# Patient Record
Sex: FEMALE | Race: WHITE | NOT HISPANIC OR LATINO | Employment: OTHER | URBAN - METROPOLITAN AREA
[De-identification: names, ages, dates, MRNs, and addresses within clinical notes are randomized per-mention and may not be internally consistent; named-entity substitution may affect disease eponyms.]

---

## 2017-12-04 RX ORDER — ATORVASTATIN CALCIUM 20 MG/1
TABLET, FILM COATED ORAL
Qty: 180 TABLET | Refills: 0 | OUTPATIENT
Start: 2017-12-04

## 2017-12-04 NOTE — TELEPHONE ENCOUNTER
Refill for statin not sent, patient receives primary care at [facility].     Please do not call patient regarding this rejected script. [facility] has requested that their patients not receive communciation from [facility].    Thanks,  KJ

## 2019-03-06 ENCOUNTER — LAB VISIT (OUTPATIENT)
Dept: LAB | Facility: OTHER | Age: 69
End: 2019-03-06
Attending: ANESTHESIOLOGY
Payer: MEDICARE

## 2019-03-06 ENCOUNTER — OFFICE VISIT (OUTPATIENT)
Dept: PAIN MEDICINE | Facility: CLINIC | Age: 69
End: 2019-03-06
Attending: ANESTHESIOLOGY
Payer: MEDICARE

## 2019-03-06 VITALS
BODY MASS INDEX: 32.28 KG/M2 | HEART RATE: 100 BPM | WEIGHT: 205.69 LBS | DIASTOLIC BLOOD PRESSURE: 82 MMHG | HEIGHT: 67 IN | TEMPERATURE: 98 F | SYSTOLIC BLOOD PRESSURE: 132 MMHG

## 2019-03-06 DIAGNOSIS — M79.10 MYALGIA: ICD-10-CM

## 2019-03-06 DIAGNOSIS — M54.16 LUMBAR RADICULOPATHY: ICD-10-CM

## 2019-03-06 DIAGNOSIS — M25.551 HIP PAIN, RIGHT: ICD-10-CM

## 2019-03-06 DIAGNOSIS — M53.3 SACROILIAC JOINT PAIN: ICD-10-CM

## 2019-03-06 LAB
ANION GAP SERPL CALC-SCNC: 10 MMOL/L
BUN SERPL-MCNC: 20 MG/DL
CALCIUM SERPL-MCNC: 9.3 MG/DL
CHLORIDE SERPL-SCNC: 107 MMOL/L
CO2 SERPL-SCNC: 24 MMOL/L
CREAT SERPL-MCNC: 1.1 MG/DL
ERYTHROCYTE [DISTWIDTH] IN BLOOD BY AUTOMATED COUNT: 17.3 %
EST. GFR  (AFRICAN AMERICAN): 59 ML/MIN/1.73 M^2
EST. GFR  (NON AFRICAN AMERICAN): 51 ML/MIN/1.73 M^2
GLUCOSE SERPL-MCNC: 112 MG/DL
HCT VFR BLD AUTO: 38.7 %
HGB BLD-MCNC: 11.8 G/DL
MCH RBC QN AUTO: 27 PG
MCHC RBC AUTO-ENTMCNC: 30.5 G/DL
MCV RBC AUTO: 89 FL
PLATELET # BLD AUTO: 282 K/UL
PMV BLD AUTO: 11.7 FL
POTASSIUM SERPL-SCNC: 4.7 MMOL/L
RBC # BLD AUTO: 4.37 M/UL
SODIUM SERPL-SCNC: 141 MMOL/L
WBC # BLD AUTO: 9.3 K/UL

## 2019-03-06 PROCEDURE — 3075F SYST BP GE 130 - 139MM HG: CPT | Mod: CPTII,S$GLB,, | Performed by: ANESTHESIOLOGY

## 2019-03-06 PROCEDURE — 99204 PR OFFICE/OUTPT VISIT, NEW, LEVL IV, 45-59 MIN: ICD-10-PCS | Mod: GC,S$GLB,, | Performed by: ANESTHESIOLOGY

## 2019-03-06 PROCEDURE — 80048 BASIC METABOLIC PNL TOTAL CA: CPT

## 2019-03-06 PROCEDURE — 3075F PR MOST RECENT SYSTOLIC BLOOD PRESS GE 130-139MM HG: ICD-10-PCS | Mod: CPTII,S$GLB,, | Performed by: ANESTHESIOLOGY

## 2019-03-06 PROCEDURE — 1101F PR PT FALLS ASSESS DOC 0-1 FALLS W/OUT INJ PAST YR: ICD-10-PCS | Mod: CPTII,S$GLB,, | Performed by: ANESTHESIOLOGY

## 2019-03-06 PROCEDURE — 3079F PR MOST RECENT DIASTOLIC BLOOD PRESSURE 80-89 MM HG: ICD-10-PCS | Mod: CPTII,S$GLB,, | Performed by: ANESTHESIOLOGY

## 2019-03-06 PROCEDURE — 99204 OFFICE O/P NEW MOD 45 MIN: CPT | Mod: GC,S$GLB,, | Performed by: ANESTHESIOLOGY

## 2019-03-06 PROCEDURE — 99999 PR PBB SHADOW E&M-EST. PATIENT-LVL V: CPT | Mod: PBBFAC,,, | Performed by: ANESTHESIOLOGY

## 2019-03-06 PROCEDURE — 36415 COLL VENOUS BLD VENIPUNCTURE: CPT

## 2019-03-06 PROCEDURE — 3079F DIAST BP 80-89 MM HG: CPT | Mod: CPTII,S$GLB,, | Performed by: ANESTHESIOLOGY

## 2019-03-06 PROCEDURE — 99999 PR PBB SHADOW E&M-EST. PATIENT-LVL V: ICD-10-PCS | Mod: PBBFAC,,, | Performed by: ANESTHESIOLOGY

## 2019-03-06 PROCEDURE — 85027 COMPLETE CBC AUTOMATED: CPT

## 2019-03-06 PROCEDURE — 1101F PT FALLS ASSESS-DOCD LE1/YR: CPT | Mod: CPTII,S$GLB,, | Performed by: ANESTHESIOLOGY

## 2019-03-06 RX ORDER — GABAPENTIN 300 MG/1
CAPSULE ORAL
Qty: 180 CAPSULE | Refills: 2 | Status: SHIPPED | OUTPATIENT
Start: 2019-03-06

## 2019-03-06 RX ORDER — GABAPENTIN 300 MG/1
CAPSULE ORAL
Qty: 180 CAPSULE | Refills: 2 | Status: SHIPPED | OUTPATIENT
Start: 2019-03-06 | End: 2019-03-06

## 2019-03-06 NOTE — PROGRESS NOTES
Chronic Pain - New Consult    Referring Physician: Anum Mosher, *    Chief Complaint:   Chief Complaint   Patient presents with    Low-back Pain    Hip Pain        SUBJECTIVE: Disclaimer: This note has been generated using voice-recognition software. There may be typographical errors that have been missed during proof-reading    Initial encounter:    Kassandra Raza presents to the clinic for the evaluation of right back and hip pain. The pain started 30 years ago following a fall and symptoms have been worsening.    Brief history: History of gastric bypass, DM, HTN, and CKD stage 3 sent for evaluation of low back pain and right leg pain by Anum Mosher NP.     Pain Description:     The pain is located in the low back area and radiates to the right groin and down her lateral right leg to her foot with numbness/tingling. Pain is constant.    At BEST  10/10     At WORST  10/10 on the WORST day.      On average pain is rated as 10/10.     Today the pain is rated as 10/10    The pain is described as aching, numbing and throbbing      Symptoms interfere with daily activity and sleeping.     Exacerbating factors: Standing, Walking, Night Time and Getting out of bed/chair.      Mitigating factors ice, medications and rest.     Patient denies urinary incontinence, bowel incontinence and significant weight loss. No night fevers. Reports weakness and loss of sensation in right leg.   Patient denies any suicidal or homicidal ideations.    Pain Medications:  Current:  Naproxen 600mg q12hrs  Gabapentin 600mg BID  Unknown muscle relaxer    Tried in Past:  NSAIDs - Naproxen that helps somewhat  TCA -Never  SNRI -Never  Anti-convulsants - Gabapentin that does not help her pain  Muscle Relaxants -yes, but unsure of the name  Opioids- Tramadol, did not help    Physical Therapy/Home Exercise: yes, just finished for her low back and right hip and it did not help     report:  Reviewed and consistent with  medication use as prescribed.    Pain Procedures: none    Chiropractor -never  Acupuncture - yes, did not help her pain  TENS unit -never  Spinal decompression -never  Joint replacement -never    Imaging:  Right hip and right knee X-ray 1/18/2016  Chronic bony change laterally in the acetabular roof with probable underlying labral degeneration. Chronic irregularity of the greater trochanter. Moderate lower lumbar spondylosis and mild chronic change of SI joints bilaterally. Right knee reveals mild to moderate osteoarthritis.     Past Medical History:   Diagnosis Date    Anxiety     Depression     Hypertension      Past Surgical History:   Procedure Laterality Date    GASTRIC BYPASS      HYSTERECTOMY      TONSILLECTOMY       Social History     Socioeconomic History    Marital status:      Spouse name: Not on file    Number of children: Not on file    Years of education: Not on file    Highest education level: Not on file   Social Needs    Financial resource strain: Not on file    Food insecurity - worry: Not on file    Food insecurity - inability: Not on file    Transportation needs - medical: Not on file    Transportation needs - non-medical: Not on file   Occupational History    Not on file   Tobacco Use    Smoking status: Never Smoker   Substance and Sexual Activity    Alcohol use: No    Drug use: No    Sexual activity: No   Other Topics Concern    Not on file   Social History Narrative    Not on file     History reviewed. No pertinent family history.    Review of patient's allergies indicates:  No Known Allergies    Current Outpatient Medications   Medication Sig    B-complex with vitamin C (Z-BEC OR EQUIV) tablet Take 1 tablet by mouth once daily.    ibuprofen (ADVIL,MOTRIN) 800 MG tablet     lisinopril-hydrochlorothiazide (PRINZIDE,ZESTORETIC) 20-12.5 mg per tablet Take 1 tablet by mouth once daily.    naproxen (NAPROSYN) 500 MG tablet Take 1 tablet (500 mg total) by mouth 2  (two) times daily with meals.    naproxen-diphenhydramine (ALEVE PM) 220-25 mg Tab Take by mouth.    trazodone (DESYREL) 50 MG tablet Take 50 mg by mouth.    alendronate (FOSAMAX) 70 MG tablet     clonazePAM (KLONOPIN) 2 MG Tab Take 2 mg by mouth 2 (two) times daily.    cloNIDine (CATAPRES) 0.2 MG tablet Take 0.2 mg by mouth 2 (two) times daily.    epinastine 0.05 % ophthalmic solution PLACE 1 DROP INTO EACH EYE TWICE A DAY    fluoxetine (PROZAC) 20 MG capsule Take 20 mg by mouth once daily.    gabapentin (NEURONTIN) 300 MG capsule     gabapentin (NEURONTIN) 300 MG capsule 300mg tablet three times a day for 3 days.  Then increase to 2 tablets at night, 1 in AM and 1 in afternoon for 3 days.  Then increase to 2 tablets at night and 2 in morning, 1 tablet in afternoon for 3 days.  Then increase to 2 tablets three times a day (1800mg)    paroxetine (PAXIL) 20 MG tablet      No current facility-administered medications for this visit.        REVIEW OF SYSTEMS:    GENERAL:  No weight loss, malaise or fevers.  HEENT:   No recent changes in vision or hearing.  NECK:  Negative for lumps, no difficulty with swallowing.  RESPIRATORY:  Negative for cough, wheezing or shortness of breath, patient denies any recent URI.  CARDIOVASCULAR:  Negative for chest pain, leg swelling or palpitations.  GI:  Negative for abdominal discomfort, blood in stools or black stools or change in bowel habits.  MUSCULOSKELETAL:  See HPI.  SKIN:  Negative for lesions, rash, and itching.  PSYCH:  No mood disorder or recent psychosocial stressors.  Patients sleep is disturbed secondary to pain.  HEMATOLOGY/LYMPHOLOGY:  Negative for prolonged bleeding, bruising easily or swollen nodes.  Patient is not currently taking any anti-coagulants  ENDO: No history of diabetes or thyroid dysfunction  NEURO:   No history of headaches, syncope, paralysis, seizures or tremors.  All other reviewed and negative other than HPI.    OBJECTIVE:    /82    "Pulse 100   Temp 98 °F (36.7 °C)   Ht 5' 7" (1.702 m)   Wt 93.3 kg (205 lb 11 oz)   BMI 32.22 kg/m²     PHYSICAL EXAMINATION:    GENERAL: Well appearing, in no acute distress, alert and oriented x3.  PSYCH:  Mood and affect appropriate.  SKIN: Skin color, texture, turgor normal, no rashes or lesions.  HEAD/FACE:  Normocephalic, atraumatic. Cranial nerves grossly intact.  CV: RRR with palpation of the radial artery.  PULM: No evidence of respiratory difficulty, symmetric chest rise.  GI:  Soft and non-tender.  BACK: Straight leg raising in the supine position is positive at 60 degrees to radicular pain on the right. Negative SLR on the left. No pain to palpation over the facet joints of the lumbar spine or spinous processes. Normal range of motion with mild pain reproduction with lumbar extension.   EXTREMITIES: Peripheral joint ROM is full and pain free without obvious instability or laxity in all four extremities. No deformities, edema, or skin discoloration. Good capillary refill.  MUSCULOSKELETAL:  Hip and knee provocative maneuvers are negative.  There is no pain with palpation over the sacroiliac joints bilaterally although she did have tenderness inferior to the SI joint on the right.  FABERs test is positive on the right for posterior pain.  FADIRs test is negative.   Bilateral lower extremity strength is 4/5 in bilateral ADF's and 4/5 in left EHL otherwise 5/5 in bilateral lower extremities.  No atrophy or tone abnormalities are noted.  NEURO: Bilateral upper and lower extremity coordination and muscle stretch reflexes are physiologic and symmetric.  Plantar response are downgoing. No clonus.  Diminished sensation in bilateral L5 dermatomal distribution.   GAIT: antalgic    Lab Results   Component Value Date    WBC 7.78 08/30/2016    HGB 12.1 08/30/2016    HCT 39.6 08/30/2016    MCV 96 08/30/2016     08/30/2016     BMP  Lab Results   Component Value Date     08/30/2016    K 4.7 08/30/2016    "  (H) 08/30/2016    CO2 24 08/30/2016    BUN 20 08/30/2016    CREATININE 1.2 08/30/2016    CALCIUM 9.0 08/30/2016    ANIONGAP 9 08/30/2016    ESTGFRAFRICA 54.0 (A) 08/30/2016    EGFRNONAA 46.9 (A) 08/30/2016     Up  ASSESSMENT: 68 y.o. year old female with pain, consistent with     Encounter Diagnoses   Name Primary?    Hip pain, right     Lumbar radiculopathy     Myalgia     Sacroiliac joint pain        PLAN:     -Will order for BMP to assess kidney function as we do not have recent labs since 2016 and since she has been taking Naproxen regularly.     -Based on her history and physical exam, discussed that her primary pain generator seems to be from right lumbar radiculopathy. She is not interested in any injections at this time, but should she wish to proceed she would likely benefit from bilateral L5 TF BRYAN. If this would not provide adequate pain relief can consider right hip intra-articular steroid injection for her pain.     -X-ray right hip, lumbar spine with flexion/extension views, and MRI lumbar spine w/o contrast to further assess for pathology noting her weakness and diminished sensation on exam today in her legs.     -She is interested in using medications to help control her pain at this time. Will increase to Gabapentin 600mg TID with titration schedule discussed with patient today. Can consider changing to Mobic in the future but would like to further assess kidney function with labs first.     -RTC after imaging is complete.     The above plan and management options were discussed at length with patient. Patient is in agreement with the above and verbalized understanding. It will be communicated with the referring physician via electronic record, fax, or mail.    Ameya Boyer  03/06/2019    I reviewed and edited the  resident's note, I conducted my own interview and physical examination and agree with the findings.     Chidi Richardson  03/06/2019

## 2019-03-06 NOTE — LETTER
March 6, 2019      Anum Mosher, NP  [address]  [facility] [place] [address]           [facility] [address]  [address]  [place] [address]  Phone: [phone]  Fax: [phone]          Patient: Kassandra Raza   MR Number: [id number]   YOB: 1950   Date of Visit: 3/6/2019       Dear Anum Mosher:    Thank you for referring Kassandra Raza to me for evaluation. Attached you will find relevant portions of my assessment and plan of care.    If you have questions, please do not hesitate to call me. I look forward to following Kassandra Raza along with you.    Sincerely,    Chidi Richardson MD    Enclosure  CC:  No Recipients    If you would like to receive this communication electronically, please contact [email or web address] or [phone] to request more information on [employer] Link access.    For providers and/or their staff who would like to refer a patient to [facility], please contact us through our one-stop-shop provider referral line, [facility] [place], at [phone].    If you feel you have received this communication in error or would no longer like to receive these types of communications, please e-mail [email or web address]

## 2019-03-12 ENCOUNTER — HOSPITAL ENCOUNTER (OUTPATIENT)
Dept: RADIOLOGY | Facility: OTHER | Age: 69
Discharge: HOME | End: 2019-03-12
Attending: ANESTHESIOLOGY
Payer: MEDICARE

## 2019-03-12 DIAGNOSIS — M54.16 LUMBAR RADICULOPATHY: ICD-10-CM

## 2019-03-12 DIAGNOSIS — M79.10 MYALGIA: ICD-10-CM

## 2019-03-12 DIAGNOSIS — M25.551 HIP PAIN, RIGHT: ICD-10-CM

## 2019-03-12 DIAGNOSIS — M53.3 SACROILIAC JOINT PAIN: ICD-10-CM

## 2019-03-12 PROCEDURE — 72148 MRI LUMBAR SPINE WITHOUT CONTRAST: ICD-10-PCS | Mod: 26,,, | Performed by: RADIOLOGY

## 2019-03-12 PROCEDURE — 72148 MRI LUMBAR SPINE W/O DYE: CPT | Mod: TC

## 2019-03-12 PROCEDURE — 72148 MRI LUMBAR SPINE W/O DYE: CPT | Mod: 26,,, | Performed by: RADIOLOGY

## 2019-03-18 ENCOUNTER — TELEPHONE (OUTPATIENT)
Dept: PAIN MEDICINE | Facility: CLINIC | Age: 69
End: 2019-03-18

## 2019-03-18 ENCOUNTER — HOSPITAL ENCOUNTER (OUTPATIENT)
Dept: RADIOLOGY | Facility: OTHER | Age: 69
Discharge: HOME | End: 2019-03-18
Attending: ANESTHESIOLOGY
Payer: MEDICARE

## 2019-03-18 DIAGNOSIS — M25.551 HIP PAIN, RIGHT: ICD-10-CM

## 2019-03-18 DIAGNOSIS — M53.3 SACROILIAC JOINT PAIN: ICD-10-CM

## 2019-03-18 DIAGNOSIS — M79.10 MYALGIA: ICD-10-CM

## 2019-03-18 DIAGNOSIS — M54.16 LUMBAR RADICULOPATHY: ICD-10-CM

## 2019-03-18 PROCEDURE — 73521 XR HIPS BILATERAL 2 VIEW INCL AP PELVIS: ICD-10-PCS | Mod: 26,,, | Performed by: RADIOLOGY

## 2019-03-18 PROCEDURE — 72110 X-RAY EXAM L-2 SPINE 4/>VWS: CPT | Mod: 26,,, | Performed by: RADIOLOGY

## 2019-03-18 PROCEDURE — 73521 X-RAY EXAM HIPS BI 2 VIEWS: CPT | Mod: 26,,, | Performed by: RADIOLOGY

## 2019-03-18 PROCEDURE — 72110 X-RAY EXAM L-2 SPINE 4/>VWS: CPT | Mod: TC,FY

## 2019-03-18 PROCEDURE — 72110 XR LUMBAR SPINE 5 VIEW WITH FLEX AND EXT: ICD-10-PCS | Mod: 26,,, | Performed by: RADIOLOGY

## 2019-03-18 PROCEDURE — 73521 X-RAY EXAM HIPS BI 2 VIEWS: CPT | Mod: TC,FY

## 2019-03-18 NOTE — TELEPHONE ENCOUNTER
Patient came into the office to report that her gabapentin is not providing her any relief.     She is currently taking about 4-6 advil daily.     She is currently taking 300mg two pills three times a day.

## 2019-03-26 NOTE — TELEPHONE ENCOUNTER
Contacted patient as staff have received response from provider. There was no answer, left a voicemail requesting a return call to discuss.

## 2019-04-03 ENCOUNTER — OFFICE VISIT (OUTPATIENT)
Dept: PAIN MEDICINE | Facility: CLINIC | Age: 69
End: 2019-04-03
Attending: ANESTHESIOLOGY
Payer: MEDICARE

## 2019-04-03 VITALS
DIASTOLIC BLOOD PRESSURE: 91 MMHG | TEMPERATURE: 98 F | HEIGHT: 67 IN | SYSTOLIC BLOOD PRESSURE: 148 MMHG | WEIGHT: 210.13 LBS | HEART RATE: 101 BPM | BODY MASS INDEX: 32.98 KG/M2

## 2019-04-03 DIAGNOSIS — M54.16 LUMBAR RADICULOPATHY: Primary | ICD-10-CM

## 2019-04-03 DIAGNOSIS — M79.10 MYALGIA: ICD-10-CM

## 2019-04-03 DIAGNOSIS — M25.551 HIP PAIN, RIGHT: ICD-10-CM

## 2019-04-03 DIAGNOSIS — M53.3 SACROILIAC JOINT PAIN: ICD-10-CM

## 2019-04-03 PROCEDURE — 3080F DIAST BP >= 90 MM HG: CPT | Mod: CPTII,S$GLB,, | Performed by: ANESTHESIOLOGY

## 2019-04-03 PROCEDURE — 3077F PR MOST RECENT SYSTOLIC BLOOD PRESSURE >= 140 MM HG: ICD-10-PCS | Mod: CPTII,S$GLB,, | Performed by: ANESTHESIOLOGY

## 2019-04-03 PROCEDURE — 1101F PR PT FALLS ASSESS DOC 0-1 FALLS W/OUT INJ PAST YR: ICD-10-PCS | Mod: CPTII,S$GLB,, | Performed by: ANESTHESIOLOGY

## 2019-04-03 PROCEDURE — 3077F SYST BP >= 140 MM HG: CPT | Mod: CPTII,S$GLB,, | Performed by: ANESTHESIOLOGY

## 2019-04-03 PROCEDURE — 1101F PT FALLS ASSESS-DOCD LE1/YR: CPT | Mod: CPTII,S$GLB,, | Performed by: ANESTHESIOLOGY

## 2019-04-03 PROCEDURE — 99999 PR PBB SHADOW E&M-EST. PATIENT-LVL IV: CPT | Mod: PBBFAC,,, | Performed by: ANESTHESIOLOGY

## 2019-04-03 PROCEDURE — 99214 OFFICE O/P EST MOD 30 MIN: CPT | Mod: S$GLB,,, | Performed by: ANESTHESIOLOGY

## 2019-04-03 PROCEDURE — 99214 PR OFFICE/OUTPT VISIT, EST, LEVL IV, 30-39 MIN: ICD-10-PCS | Mod: S$GLB,,, | Performed by: ANESTHESIOLOGY

## 2019-04-03 PROCEDURE — 99999 PR PBB SHADOW E&M-EST. PATIENT-LVL IV: ICD-10-PCS | Mod: PBBFAC,,, | Performed by: ANESTHESIOLOGY

## 2019-04-03 PROCEDURE — 3080F PR MOST RECENT DIASTOLIC BLOOD PRESSURE >= 90 MM HG: ICD-10-PCS | Mod: CPTII,S$GLB,, | Performed by: ANESTHESIOLOGY

## 2019-04-03 RX ORDER — MELOXICAM 7.5 MG/1
7.5 TABLET ORAL DAILY
Qty: 60 TABLET | Refills: 0 | Status: SHIPPED | OUTPATIENT
Start: 2019-04-03 | End: 2019-05-03

## 2019-04-03 NOTE — PROGRESS NOTES
Chronic Pain - follow up    Referring Physician: No ref. provider found    Chief Complaint:   No chief complaint on file.       SUBJECTIVE: Disclaimer: This note has been generated using voice-recognition software. There may be typographical errors that have been missed during proof-reading  Interval history 04/03/2019:  Since previous encounter the patient had MRI of the lumbar spine which shows multilevel bilateral neural foraminal stenosis worst at L3-4 which may account for part radicular symptoms of weakness.  Additionally she has x-rays of the lumbar spine which did not reveal any evidence of instability with flexion extension and an x-ray of the hip which showed age-appropriate degenerative arthritis.  The BMP did not show any elevation in her creatinine and gabapentin 600 mg t.i.d. has been helping with her neuropathic symptoms in her lower extremity at night but not helping with her back pain in a way that she considers meaningful.  She is concerned about the weakness in her lower extremity and continues to exercise on a daily basis. No other health changes since previous encounter.    Initial encounter:    Kassandra Raza presents to the clinic for the evaluation of right back and hip pain. The pain started 30 years ago following a fall and symptoms have been worsening.    Brief history: History of gastric bypass, DM, HTN, and CKD stage 3 sent for evaluation of low back pain and right leg pain by Anum Mosher NP.     Pain Description:     The pain is located in the low back area and radiates to the right groin and down her lateral right leg to her foot with numbness/tingling. Pain is constant.    At BEST  10/10     At WORST  10/10 on the WORST day.      On average pain is rated as 10/10.     Today the pain is rated as 10/10    The pain is described as aching, numbing and throbbing      Symptoms interfere with daily activity and sleeping.     Exacerbating factors: Standing, Walking, Night Time and  Getting out of bed/chair.      Mitigating factors ice, medications and rest.     Patient denies urinary incontinence, bowel incontinence and significant weight loss. No night fevers. Reports weakness and loss of sensation in right leg.   Patient denies any suicidal or homicidal ideations.    Pain Medications:  Current:  Naproxen 600mg q12hrs  Gabapentin 600mg BID  Unknown muscle relaxer    Tried in Past:  NSAIDs - Naproxen that helps somewhat  TCA -Never  SNRI -Never  Anti-convulsants - Gabapentin that does not help her pain  Muscle Relaxants -yes, but unsure of the name  Opioids- Tramadol, did not help    Physical Therapy/Home Exercise: yes, just finished for her low back and right hip and it did not help     report:  Reviewed and consistent with medication use as prescribed.    Pain Procedures: none    Chiropractor -never  Acupuncture - yes, did not help her pain  TENS unit -never  Spinal decompression -never  Joint replacement -never    Imaging:  MRI lumbar spine   3/12/2019  EXAMINATION:  MRI LUMBAR SPINE WITHOUT CONTRAST    CLINICAL HISTORY:  right lower extremity radiculopathy with L4/5 weakness bilaterally and decreased sensation in the L5 distribution; Pain in right hip    TECHNIQUE:  Multiplanar, multisequence MR images were acquired from the thoracolumbar junction to the sacrum without the administration of contrast.    COMPARISON:  None.    FINDINGS:  The vertebral bodies demonstrate no evidence of fracture, osseous destructive process or aggressive bone marrow replacement process.    Subtle anterolisthesis at L3-4.  Otherwise normal sagittal alignment is preserved.    Degenerative changes individual disc levels further detailed below:    L1-2: Minimal disc bulging and facet arthropathy.  No spinal stenosis or neural foraminal narrowing.    L2-3: Mild facet arthropathy.  No significant degenerative disc disease.  No spinal stenosis or neural foraminal narrowing.    L3-4: Moderate facet arthropathy with  thickening ligamentum flavum.  Moderate degenerative disc disease with loss of disc height, degenerative endplate changes, endplate marginal osteophyte formation and mild disc bulging.  Mild anterolisthesis with unroofing of the disc.  There is mild bilateral lateral recess stenosis and moderate bilateral neural foraminal narrowing.  No spinal stenosis.    L4-5: Left foraminal annular fissure.  Moderate facet arthropathy.  No significant spinal stenosis.  Mild bilateral neural foraminal encroachment.    L5-S1: Moderate facet arthropathy.  No significant degenerative disc disease.  No spinal stenosis or neural foraminal narrowing.    Spinal canal remains capacious.  The terminal spinal cord, conus, and cauda equina demonstrate normal caliber, morphology, and signal.  No spinal canal or paraspinous mass is identified.    Moderate colonic diverticulosis.    Rounded T2 hyperintense foci in the kidneys, likely simple cysts.    Edema like signal in the dependent subcutaneous soft tissues.      Impression       Moderate degenerative change at the L3-4 level as above with resultant lateral recess and neural foraminal narrowing.    Modest degenerative change elsewhere in the lumbar spine without significant spinal stenosis or high-grade neural foraminal narrowing.    Moderate colonic diverticulosis.  Probable renal cysts.     Xray of the hips 3/18/2019  EXAMINATION:  XR HIPS BILATERAL 2 VIEW INCL AP PELVIS    CLINICAL HISTORY:  Pain in right hip    TECHNIQUE:  AP view of the pelvis and frogleg lateral views of both hips were performed.    COMPARISON:  None.    FINDINGS:  The osseous structures, soft tissues and joint spaces are preserved.  Incidental note is made of a linear radiopaque focus that appears to be with in the rectal stool.      Impression       This exam is within normal limits.     Xray lumbar spine flex/ext  XR LUMBAR SPINE 5 VIEW WITH FLEX AND EXT    CLINICAL HISTORY:  r/o instability evaluate facet  arthropathy;  Pain in right hip    TECHNIQUE:  AP and lateral views as well as lateral flexion and extension images are performed through the lumbar spine.    COMPARISON:  None    FINDINGS:  AP, lateral, flexion and extension, obliqueand coned down lateral radiographs of the lumbar spine reveal 5 non-rib bearing lumbar vertebral bodies with normal vertebral body heights and pedicles.  There is advanced loss of disc height with Modic degenerative endplate changes at L3-L4.  The remaining interval vertebral disc heights are preserved.  There is minimal anterolisthesis at L3-L4 and L4-L5. No instability on flexion and extension views.  Advanced facet arthropathy is identified from L3 through S1.  Facet arthropathy is also noted to mild degree at L2-L3.  Calcified aortoiliac atherosclerosis is identified.  Incidental note is made of cholecystectomy clips.  The surrounding soft tissues are normal.      Impression       1. Multilevel degenerative disc and joint disease.  The degenerative disc disease is most advanced at L3-L4.  2. Minimal anterolisthesis of L3 on L4 and L4 on L5 without instability.         Right hip and right knee X-ray 1/18/2016  Chronic bony change laterally in the acetabular roof with probable underlying labral degeneration. Chronic irregularity of the greater trochanter. Moderate lower lumbar spondylosis and mild chronic change of SI joints bilaterally. Right knee reveals mild to moderate osteoarthritis.     Past Medical History:   Diagnosis Date    Anxiety     Depression     Hypertension      Past Surgical History:   Procedure Laterality Date    GASTRIC BYPASS      HYSTERECTOMY      TONSILLECTOMY       Social History     Socioeconomic History    Marital status:      Spouse name: Not on file    Number of children: Not on file    Years of education: Not on file    Highest education level: Not on file   Occupational History    Not on file   Social Needs    Financial resource strain:  Not on file    Food insecurity:     Worry: Not on file     Inability: Not on file    Transportation needs:     Medical: Not on file     Non-medical: Not on file   Tobacco Use    Smoking status: Never Smoker   Substance and Sexual Activity    Alcohol use: No    Drug use: No    Sexual activity: Never   Lifestyle    Physical activity:     Days per week: Not on file     Minutes per session: Not on file    Stress: Not on file   Relationships    Social connections:     Talks on phone: Not on file     Gets together: Not on file     Attends Baptism service: Not on file     Active member of club or organization: Not on file     Attends meetings of clubs or organizations: Not on file     Relationship status: Not on file   Other Topics Concern    Not on file   Social History Narrative    Not on file     No family history on file.    Review of patient's allergies indicates:  No Known Allergies    Current Outpatient Medications   Medication Sig    B-complex with vitamin C (Z-BEC OR EQUIV) tablet Take 1 tablet by mouth once daily.    cloNIDine (CATAPRES) 0.2 MG tablet Take 0.2 mg by mouth 2 (two) times daily.    epinastine 0.05 % ophthalmic solution PLACE 1 DROP INTO EACH EYE TWICE A DAY    gabapentin (NEURONTIN) 300 MG capsule 300mg tablet three times a day for 7 days.  Then increase to 2 tablets at night, 1 in AM and 1 in afternoon for 7 days.  Then increase to 2 tablets at night and 2 in morning, 1 tablet in afternoon for 7 days.  Then increase to 2 tablets three times a day (1800mg)    lisinopril-hydrochlorothiazide (PRINZIDE,ZESTORETIC) 20-12.5 mg per tablet Take 1 tablet by mouth once daily.    naproxen (NAPROSYN) 500 MG tablet Take 1 tablet (500 mg total) by mouth 2 (two) times daily with meals.    naproxen-diphenhydramine (ALEVE PM) 220-25 mg Tab Take by mouth.    trazodone (DESYREL) 50 MG tablet Take 50 mg by mouth.    alendronate (FOSAMAX) 70 MG tablet     clonazePAM (KLONOPIN) 2 MG Tab Take 2 mg  "by mouth 2 (two) times daily.    fluoxetine (PROZAC) 20 MG capsule Take 20 mg by mouth once daily.    ibuprofen (ADVIL,MOTRIN) 800 MG tablet     paroxetine (PAXIL) 20 MG tablet      No current facility-administered medications for this visit.        REVIEW OF SYSTEMS:    GENERAL:  No weight loss, malaise or fevers.  HEENT:   No recent changes in vision or hearing.  NECK:  Negative for lumps, no difficulty with swallowing.  RESPIRATORY:  Negative for cough, wheezing or shortness of breath, patient denies any recent URI.  CARDIOVASCULAR:  Negative for chest pain, leg swelling or palpitations.  GI:  Negative for abdominal discomfort, blood in stools or black stools or change in bowel habits.  MUSCULOSKELETAL:  See HPI.  SKIN:  Negative for lesions, rash, and itching.  PSYCH:  No mood disorder or recent psychosocial stressors.  Patients sleep is disturbed secondary to pain.  HEMATOLOGY/LYMPHOLOGY:  Negative for prolonged bleeding, bruising easily or swollen nodes.  Patient is not currently taking any anti-coagulants  ENDO: No history of diabetes or thyroid dysfunction  NEURO:   No history of headaches, syncope, paralysis, seizures or tremors.  All other reviewed and negative other than HPI.    OBJECTIVE:    BP (!) 148/91   Pulse 101   Temp 98.3 °F (36.8 °C)   Ht 5' 7" (1.702 m)   Wt 95.3 kg (210 lb 1.6 oz)   BMI 32.91 kg/m²     PHYSICAL EXAMINATION:    GENERAL: Well appearing, in no acute distress, alert and oriented x3.  PSYCH:  Mood and affect appropriate.  SKIN: Skin color, texture, turgor normal, no rashes or lesions.  HEAD/FACE:  Normocephalic, atraumatic. Cranial nerves grossly intact.  CV: RRR with palpation of the radial artery.  PULM: No evidence of respiratory difficulty, symmetric chest rise.  GI:  Soft and non-tender.  BACK: Straight leg raising in the supine position is positive at 60 degrees to radicular pain on the right. Negative SLR on the left. No pain to palpation over the facet joints of the " lumbar spine or spinous processes. Normal range of motion with mild pain reproduction with lumbar extension.   EXTREMITIES: Peripheral joint ROM is full and pain free without obvious instability or laxity in all four extremities. No deformities, edema, or skin discoloration. Good capillary refill.  MUSCULOSKELETAL:  Hip and knee provocative maneuvers are negative.  There is no pain with palpation over the sacroiliac joints bilaterally although she did have tenderness inferior to the SI joint on the right.  FABERs test is positive on the right for posterior pain.  FADIRs test is negative.   Bilateral lower extremity strength is 4/5 in bilateral ADF's and 4/5 in left EHL otherwise 5/5 in bilateral lower extremities.  No atrophy or tone abnormalities are noted.  NEURO: Bilateral upper and lower extremity coordination and muscle stretch reflexes are physiologic and symmetric.  Plantar response are downgoing. No clonus.  Diminished sensation in bilateral L5 dermatomal distribution.   GAIT: antalgic    Lab Results   Component Value Date    WBC 9.30 03/06/2019    HGB 11.8 (L) 03/06/2019    HCT 38.7 03/06/2019    MCV 89 03/06/2019     03/06/2019     BMP  Lab Results   Component Value Date     03/06/2019    K 4.7 03/06/2019     03/06/2019    CO2 24 03/06/2019    BUN 20 03/06/2019    CREATININE 1.1 03/06/2019    CALCIUM 9.3 03/06/2019    ANIONGAP 10 03/06/2019    ESTGFRAFRICA 59 (A) 03/06/2019    EGFRNONAA 51 (A) 03/06/2019     Up  ASSESSMENT: 69 y.o. year old female with pain, consistent with     Encounter Diagnoses   Name Primary?    Lumbar radiculopathy Yes    Myalgia     Sacroiliac joint pain     Hip pain, right        PLAN:     -meloxicam 7.5 mg per day for 1 week if insufficient increase to 15 mg per day    -Based on her history and physical exam, discussed that her primary pain generator seems to be from right lumbar radiculopathy. She is not interested in any injections at this time, but should  she wish to proceed she would likely benefit from bilateral L4 TF BRYAN. If this would not provide adequate pain relief can consider right hip intra-articular steroid injection for her pain.     -continue gabapentin 600 mg t.i.d.    -continue home exercises    -follow-up in 2 weeks with NP and depending on response to meloxicam if there is insufficient relief will consider the epidural injections, if there is good relief continue physical therapy for strengthening of the lower extremity and follow-up in 3 months.    The above plan and management options were discussed at length with patient. Patient is in agreement with the above and verbalized understanding. It will be communicated with the referring physician via electronic record, fax, or mail.    Chidi Richardson  04/03/2019

## 2019-04-15 ENCOUNTER — OFFICE VISIT (OUTPATIENT)
Dept: PAIN MEDICINE | Facility: CLINIC | Age: 69
End: 2019-04-15
Payer: MEDICARE

## 2019-04-15 VITALS
BODY MASS INDEX: 32.28 KG/M2 | TEMPERATURE: 98 F | DIASTOLIC BLOOD PRESSURE: 81 MMHG | WEIGHT: 205.69 LBS | HEART RATE: 78 BPM | SYSTOLIC BLOOD PRESSURE: 152 MMHG | HEIGHT: 67 IN

## 2019-04-15 DIAGNOSIS — M47.26 OSTEOARTHRITIS OF SPINE WITH RADICULOPATHY, LUMBAR REGION: ICD-10-CM

## 2019-04-15 DIAGNOSIS — M53.3 SACROILIAC JOINT PAIN: ICD-10-CM

## 2019-04-15 DIAGNOSIS — M54.16 LUMBAR RADICULOPATHY: Primary | ICD-10-CM

## 2019-04-15 DIAGNOSIS — M51.36 DDD (DEGENERATIVE DISC DISEASE), LUMBAR: ICD-10-CM

## 2019-04-15 PROCEDURE — 99999 PR PBB SHADOW E&M-EST. PATIENT-LVL III: ICD-10-PCS | Mod: PBBFAC,,, | Performed by: NURSE PRACTITIONER

## 2019-04-15 PROCEDURE — 99213 PR OFFICE/OUTPT VISIT, EST, LEVL III, 20-29 MIN: ICD-10-PCS | Mod: S$GLB,,, | Performed by: NURSE PRACTITIONER

## 2019-04-15 PROCEDURE — 3077F PR MOST RECENT SYSTOLIC BLOOD PRESSURE >= 140 MM HG: ICD-10-PCS | Mod: CPTII,S$GLB,, | Performed by: NURSE PRACTITIONER

## 2019-04-15 PROCEDURE — 1101F PT FALLS ASSESS-DOCD LE1/YR: CPT | Mod: CPTII,S$GLB,, | Performed by: NURSE PRACTITIONER

## 2019-04-15 PROCEDURE — 99213 OFFICE O/P EST LOW 20 MIN: CPT | Mod: S$GLB,,, | Performed by: NURSE PRACTITIONER

## 2019-04-15 PROCEDURE — 3079F PR MOST RECENT DIASTOLIC BLOOD PRESSURE 80-89 MM HG: ICD-10-PCS | Mod: CPTII,S$GLB,, | Performed by: NURSE PRACTITIONER

## 2019-04-15 PROCEDURE — 99999 PR PBB SHADOW E&M-EST. PATIENT-LVL III: CPT | Mod: PBBFAC,,, | Performed by: NURSE PRACTITIONER

## 2019-04-15 PROCEDURE — 3079F DIAST BP 80-89 MM HG: CPT | Mod: CPTII,S$GLB,, | Performed by: NURSE PRACTITIONER

## 2019-04-15 PROCEDURE — 3077F SYST BP >= 140 MM HG: CPT | Mod: CPTII,S$GLB,, | Performed by: NURSE PRACTITIONER

## 2019-04-15 PROCEDURE — 1101F PR PT FALLS ASSESS DOC 0-1 FALLS W/OUT INJ PAST YR: ICD-10-PCS | Mod: CPTII,S$GLB,, | Performed by: NURSE PRACTITIONER

## 2019-04-15 NOTE — PROGRESS NOTES
Chronic Pain - follow up    Referring Physician: No ref. provider found    Chief Complaint:   Chief Complaint   Patient presents with    Low-back Pain    Leg Pain        SUBJECTIVE: Disclaimer: This note has been generated using voice-recognition software. There may be typographical errors that have been missed during proof-reading    Interval History 4/15/2019:  The patient returns to clinic today for follow up. She continues to report low back pain that radiates down the anterolateral aspect of her right leg to her foot. She also reports pain that radiates into her right groin. She denies any left leg pain. Her pain is worse with prolonged walking. She is currently taking Gabapentin 600 mg TID with some benefit. She reports limited relief with Mobic. She denies any other health changes. She denies any bowel or bladder incontinence. Her pain today is 8/10.     Interval history 04/03/2019:  Since previous encounter the patient had MRI of the lumbar spine which shows multilevel bilateral neural foraminal stenosis worst at L3-4 which may account for part radicular symptoms of weakness.  Additionally she has x-rays of the lumbar spine which did not reveal any evidence of instability with flexion extension and an x-ray of the hip which showed age-appropriate degenerative arthritis.  The BMP did not show any elevation in her creatinine and gabapentin 600 mg t.i.d. has been helping with her neuropathic symptoms in her lower extremity at night but not helping with her back pain in a way that she considers meaningful.  She is concerned about the weakness in her lower extremity and continues to exercise on a daily basis. No other health changes since previous encounter.    Initial encounter:    Kassandra Raza presents to the clinic for the evaluation of right back and hip pain. The pain started 30 years ago following a fall and symptoms have been worsening.    Brief history: History of gastric bypass, DM, HTN, and CKD  stage 3 sent for evaluation of low back pain and right leg pain by Anum Mosher NP.     Pain Description:     The pain is located in the low back area and radiates to the right groin and down her lateral right leg to her foot with numbness/tingling. Pain is constant.    At BEST  10/10     At WORST  10/10 on the WORST day.      On average pain is rated as 10/10.     Today the pain is rated as 10/10    The pain is described as aching, numbing and throbbing      Symptoms interfere with daily activity and sleeping.     Exacerbating factors: Standing, Walking, Night Time and Getting out of bed/chair.      Mitigating factors ice, medications and rest.     Patient denies urinary incontinence, bowel incontinence and significant weight loss. No night fevers. Reports weakness and loss of sensation in right leg.   Patient denies any suicidal or homicidal ideations.    Pain Medications:  Current:  Mobic  Gabapentin 600mg TID  Unknown muscle relaxer    Tried in Past:  NSAIDs - Naproxen that helps somewhat  TCA -Never  SNRI -Never  Anti-convulsants - Gabapentin that does not help her pain  Muscle Relaxants -yes, but unsure of the name  Opioids- Tramadol, did not help    Physical Therapy/Home Exercise: yes, just finished for her low back and right hip and it did not help     report:  Reviewed and consistent with medication use as prescribed.    Pain Procedures: none    Chiropractor -never  Acupuncture - yes, did not help her pain  TENS unit -never  Spinal decompression -never  Joint replacement -never    Imaging:  MRI lumbar spine   3/12/2019  EXAMINATION:  MRI LUMBAR SPINE WITHOUT CONTRAST    CLINICAL HISTORY:  right lower extremity radiculopathy with L4/5 weakness bilaterally and decreased sensation in the L5 distribution; Pain in right hip    TECHNIQUE:  Multiplanar, multisequence MR images were acquired from the thoracolumbar junction to the sacrum without the administration of  contrast.    COMPARISON:  None.    FINDINGS:  The vertebral bodies demonstrate no evidence of fracture, osseous destructive process or aggressive bone marrow replacement process.    Subtle anterolisthesis at L3-4.  Otherwise normal sagittal alignment is preserved.    Degenerative changes individual disc levels further detailed below:    L1-2: Minimal disc bulging and facet arthropathy.  No spinal stenosis or neural foraminal narrowing.    L2-3: Mild facet arthropathy.  No significant degenerative disc disease.  No spinal stenosis or neural foraminal narrowing.    L3-4: Moderate facet arthropathy with thickening ligamentum flavum.  Moderate degenerative disc disease with loss of disc height, degenerative endplate changes, endplate marginal osteophyte formation and mild disc bulging.  Mild anterolisthesis with unroofing of the disc.  There is mild bilateral lateral recess stenosis and moderate bilateral neural foraminal narrowing.  No spinal stenosis.    L4-5: Left foraminal annular fissure.  Moderate facet arthropathy.  No significant spinal stenosis.  Mild bilateral neural foraminal encroachment.    L5-S1: Moderate facet arthropathy.  No significant degenerative disc disease.  No spinal stenosis or neural foraminal narrowing.    Spinal canal remains capacious.  The terminal spinal cord, conus, and cauda equina demonstrate normal caliber, morphology, and signal.  No spinal canal or paraspinous mass is identified.    Moderate colonic diverticulosis.    Rounded T2 hyperintense foci in the kidneys, likely simple cysts.    Edema like signal in the dependent subcutaneous soft tissues.      Impression       Moderate degenerative change at the L3-4 level as above with resultant lateral recess and neural foraminal narrowing.    Modest degenerative change elsewhere in the lumbar spine without significant spinal stenosis or high-grade neural foraminal narrowing.    Moderate colonic diverticulosis.  Probable renal cysts.      Xray of the hips 3/18/2019  EXAMINATION:  XR HIPS BILATERAL 2 VIEW INCL AP PELVIS    CLINICAL HISTORY:  Pain in right hip    TECHNIQUE:  AP view of the pelvis and frogleg lateral views of both hips were performed.    COMPARISON:  None.    FINDINGS:  The osseous structures, soft tissues and joint spaces are preserved.  Incidental note is made of a linear radiopaque focus that appears to be with in the rectal stool.      Impression       This exam is within normal limits.     Xray lumbar spine flex/ext  XR LUMBAR SPINE 5 VIEW WITH FLEX AND EXT    CLINICAL HISTORY:  r/o instability evaluate facet arthropathy;  Pain in right hip    TECHNIQUE:  AP and lateral views as well as lateral flexion and extension images are performed through the lumbar spine.    COMPARISON:  None    FINDINGS:  AP, lateral, flexion and extension, obliqueand coned down lateral radiographs of the lumbar spine reveal 5 non-rib bearing lumbar vertebral bodies with normal vertebral body heights and pedicles.  There is advanced loss of disc height with Modic degenerative endplate changes at L3-L4.  The remaining interval vertebral disc heights are preserved.  There is minimal anterolisthesis at L3-L4 and L4-L5. No instability on flexion and extension views.  Advanced facet arthropathy is identified from L3 through S1.  Facet arthropathy is also noted to mild degree at L2-L3.  Calcified aortoiliac atherosclerosis is identified.  Incidental note is made of cholecystectomy clips.  The surrounding soft tissues are normal.      Impression       1. Multilevel degenerative disc and joint disease.  The degenerative disc disease is most advanced at L3-L4.  2. Minimal anterolisthesis of L3 on L4 and L4 on L5 without instability.         Right hip and right knee X-ray 1/18/2016  Chronic bony change laterally in the acetabular roof with probable underlying labral degeneration. Chronic irregularity of the greater trochanter. Moderate lower lumbar spondylosis  and mild chronic change of SI joints bilaterally. Right knee reveals mild to moderate osteoarthritis.     Past Medical History:   Diagnosis Date    Anxiety     Depression     Hypertension      Past Surgical History:   Procedure Laterality Date    GASTRIC BYPASS      HYSTERECTOMY      TONSILLECTOMY       Social History     Socioeconomic History    Marital status:      Spouse name: Not on file    Number of children: Not on file    Years of education: Not on file    Highest education level: Not on file   Occupational History    Not on file   Social Needs    Financial resource strain: Not on file    Food insecurity:     Worry: Not on file     Inability: Not on file    Transportation needs:     Medical: Not on file     Non-medical: Not on file   Tobacco Use    Smoking status: Never Smoker   Substance and Sexual Activity    Alcohol use: No    Drug use: No    Sexual activity: Never   Lifestyle    Physical activity:     Days per week: Not on file     Minutes per session: Not on file    Stress: Not on file   Relationships    Social connections:     Talks on phone: Not on file     Gets together: Not on file     Attends Yarsanism service: Not on file     Active member of club or organization: Not on file     Attends meetings of clubs or organizations: Not on file     Relationship status: Not on file   Other Topics Concern    Not on file   Social History Narrative    Not on file     No family history on file.    Review of patient's allergies indicates:  No Known Allergies    Current Outpatient Medications   Medication Sig    alendronate (FOSAMAX) 70 MG tablet     B-complex with vitamin C (Z-BEC OR EQUIV) tablet Take 1 tablet by mouth once daily.    clonazePAM (KLONOPIN) 2 MG Tab Take 2 mg by mouth 2 (two) times daily.    cloNIDine (CATAPRES) 0.2 MG tablet Take 0.2 mg by mouth 2 (two) times daily.    epinastine 0.05 % ophthalmic solution PLACE 1 DROP INTO EACH EYE TWICE A DAY    fluoxetine  (PROZAC) 20 MG capsule Take 20 mg by mouth once daily.    gabapentin (NEURONTIN) 300 MG capsule 300mg tablet three times a day for 7 days.  Then increase to 2 tablets at night, 1 in AM and 1 in afternoon for 7 days.  Then increase to 2 tablets at night and 2 in morning, 1 tablet in afternoon for 7 days.  Then increase to 2 tablets three times a day (1800mg)    ibuprofen (ADVIL,MOTRIN) 800 MG tablet     lisinopril-hydrochlorothiazide (PRINZIDE,ZESTORETIC) 20-12.5 mg per tablet Take 1 tablet by mouth once daily.    meloxicam (MOBIC) 7.5 MG tablet Take 1 tablet (7.5 mg total) by mouth once daily. Take 7.5 mg x 1 week, if this is not helping sufficiently increase to BID    naproxen (NAPROSYN) 500 MG tablet Take 1 tablet (500 mg total) by mouth 2 (two) times daily with meals.    naproxen-diphenhydramine (ALEVE PM) 220-25 mg Tab Take by mouth.    paroxetine (PAXIL) 20 MG tablet     trazodone (DESYREL) 50 MG tablet Take 50 mg by mouth.     No current facility-administered medications for this visit.        REVIEW OF SYSTEMS:    GENERAL:  No weight loss, malaise or fevers.  HEENT:   No recent changes in vision or hearing.  NECK:  Negative for lumps, no difficulty with swallowing.  RESPIRATORY:  Negative for cough, wheezing or shortness of breath, patient denies any recent URI.  CARDIOVASCULAR:  Negative for chest pain, leg swelling or palpitations.  GI:  Negative for abdominal discomfort, blood in stools or black stools or change in bowel habits.  MUSCULOSKELETAL:  See HPI.  SKIN:  Negative for lesions, rash, and itching.  PSYCH:  No mood disorder or recent psychosocial stressors.  Patients sleep is disturbed secondary to pain.  HEMATOLOGY/LYMPHOLOGY:  Negative for prolonged bleeding, bruising easily or swollen nodes.  Patient is not currently taking any anti-coagulants  ENDO: No history of diabetes or thyroid dysfunction  NEURO:   No history of headaches, syncope, paralysis, seizures or tremors.  All other reviewed  "and negative other than HPI.    OBJECTIVE:    BP (!) 152/81   Pulse 78   Temp 97.8 °F (36.6 °C)   Ht 5' 7" (1.702 m)   Wt 93.3 kg (205 lb 11 oz)   BMI 32.22 kg/m²     PHYSICAL EXAMINATION:    GENERAL: Well appearing, in no acute distress, alert and oriented x3.  PSYCH:  Mood and affect appropriate.  SKIN: Skin color, texture, turgor normal, no rashes or lesions.  HEAD/FACE:  Normocephalic, atraumatic. Cranial nerves grossly intact.  CV: RRR with palpation of the radial artery.  PULM: No evidence of respiratory difficulty, symmetric chest rise.  GI:  Soft and non-tender.  BACK: Straight leg raising in the supine position is positive for radicular pain on the right, negative on the left. Mild pain with palpation over lumbar spine. Limited ROM with pain on extension. Negative facet loading bilaterally.   EXTREMITIES: Peripheral joint ROM is full and pain free without obvious instability or laxity in all four extremities. No deformities, edema, or skin discoloration. Good capillary refill.  MUSCULOSKELETAL:  Hip and knee provocative maneuvers are negative.  There is no pain with palpation over the sacroiliac joints bilaterally. FABERs test is positive on the right, negative on the left. FADIRs test is negative. Bilateral lower extremity strength is 4/5 in bilateral ADF's and 4/5 in left EHL,therwise 5/5 in bilateral lower extremities.  No atrophy or tone abnormalities are noted.  NEURO: Bilateral upper and lower extremity coordination and muscle stretch reflexes are physiologic and symmetric.  Plantar response are downgoing. No clonus.  Diminished sensation in bilateral L5 dermatomal distribution.   GAIT: antalgic- ambulates without assistance.     Lab Results   Component Value Date    WBC 9.30 03/06/2019    HGB 11.8 (L) 03/06/2019    HCT 38.7 03/06/2019    MCV 89 03/06/2019     03/06/2019     BMP  Lab Results   Component Value Date     03/06/2019    K 4.7 03/06/2019     03/06/2019    CO2 24 " 03/06/2019    BUN 20 03/06/2019    CREATININE 1.1 03/06/2019    CALCIUM 9.3 03/06/2019    ANIONGAP 10 03/06/2019    ESTGFRAFRICA 59 (A) 03/06/2019    EGFRNONAA 51 (A) 03/06/2019       ASSESSMENT: 69 y.o. year old female with pain, consistent with     Encounter Diagnoses   Name Primary?    Lumbar radiculopathy Yes    Osteoarthritis of spine with radiculopathy, lumbar region     DDD (degenerative disc disease), lumbar     Sacroiliac joint pain        PLAN:     - Previous imaging was reviewed and discussed with the patient today.    - We discussed bilateral L4 TF BRYAN. She is not interested in injections at this time.     - She may also benefit from right hip injection.     - Increased Gabapentin to 600 mg TID.     - If limited relief with above, we may consider switching to Lyrica.     - Continue Mobic 15 mg per day.     - Continue home exercise routine.     - RTC in 1 month.     - Dr. Richardson was consulted on the patient and agrees with this plan.    The above plan and management options were discussed at length with patient. Patient is in agreement with the above and verbalized understanding.     Myriam Hodges, SANGEETA  04/15/2019

## 2019-04-15 NOTE — PATIENT INSTRUCTIONS
Increase Gabapentin to 900 mg (3 pills) in the morning, 600 mg (2 pills) in the afternoon, and 900 mg (3 pills) at bedtime.

## 2020-08-12 ENCOUNTER — TELEPHONE (OUTPATIENT)
Dept: PAIN MEDICINE | Facility: CLINIC | Age: 70
End: 2020-08-12

## 2020-08-12 NOTE — TELEPHONE ENCOUNTER
Staff spoke with patient to confirm appointment scheduled 8/13/20 at 1:40pm as in office visit with Cher Cortez on the 8th floor suit 820 patient verbalized understanding.